# Patient Record
Sex: FEMALE | Race: BLACK OR AFRICAN AMERICAN | NOT HISPANIC OR LATINO | URBAN - METROPOLITAN AREA
[De-identification: names, ages, dates, MRNs, and addresses within clinical notes are randomized per-mention and may not be internally consistent; named-entity substitution may affect disease eponyms.]

---

## 2018-06-14 ENCOUNTER — OFFICE VISIT (OUTPATIENT)
Dept: URBAN - METROPOLITAN AREA CLINIC 18 | Facility: CLINIC | Age: 38
End: 2018-06-14
Payer: COMMERCIAL

## 2018-06-14 DIAGNOSIS — H40.023 OPEN ANGLE WITH BORDERLINE FINDINGS, HIGH RISK, BILATERAL: Primary | ICD-10-CM

## 2018-06-14 PROCEDURE — 99213 OFFICE O/P EST LOW 20 MIN: CPT | Performed by: OPTOMETRIST

## 2018-06-14 RX ORDER — TIMOLOL MALEATE 5 MG/ML
0.5 % SOLUTION/ DROPS OPHTHALMIC
Qty: 1 | Refills: 3 | Status: INACTIVE
Start: 2018-06-14 | End: 2018-09-06

## 2018-06-14 ASSESSMENT — INTRAOCULAR PRESSURE
OD: 21
OS: 21

## 2018-06-14 ASSESSMENT — KERATOMETRY
OS: 41.88
OD: 42.00

## 2018-06-14 NOTE — IMPRESSION/PLAN
Impression: Open angle with borderline findings, high risk, bilateral: H40.023. Family hx (mother), Large C/D Goal IOP 15's OU Plan: Borderline glaucoma, intraocular pressure stable without medication. Continue without medication and observe. D/CBrimonidine BID OU, Start Timolol BID OU (Er'xed to pharmacy). Will continue to monitor IOP.

## 2018-08-02 ENCOUNTER — OFFICE VISIT (OUTPATIENT)
Dept: URBAN - METROPOLITAN AREA CLINIC 18 | Facility: CLINIC | Age: 38
End: 2018-08-02
Payer: COMMERCIAL

## 2018-08-02 PROCEDURE — 99213 OFFICE O/P EST LOW 20 MIN: CPT | Performed by: OPTOMETRIST

## 2018-08-02 RX ORDER — DORZOLAMIDE HYDROCHLORIDE AND TIMOLOL MALEATE 20; 5 MG/ML; MG/ML
SOLUTION/ DROPS OPHTHALMIC
Qty: 5 | Refills: 6 | Status: ACTIVE
Start: 2018-08-02

## 2018-08-02 ASSESSMENT — INTRAOCULAR PRESSURE
OD: 19
OS: 18

## 2018-08-02 ASSESSMENT — KERATOMETRY
OD: 42.00
OS: 42.13

## 2018-08-02 NOTE — IMPRESSION/PLAN
Impression: Open angle with borderline findings, high risk, bilateral: H40.023. IOP Borderline, increased cupping OU, thin RNFL, normal CCT's no need to adjust IOP's Plan: Discussed diagnosis in detail with patient. Discussed treatment options with patient. D/C Timolol and Start Cosopt BID OU. Stressed compliance. Will reassess IOP OU in 1 month.

## 2018-09-06 ENCOUNTER — OFFICE VISIT (OUTPATIENT)
Dept: URBAN - METROPOLITAN AREA CLINIC 18 | Facility: CLINIC | Age: 38
End: 2018-09-06
Payer: COMMERCIAL

## 2018-09-06 PROCEDURE — 99213 OFFICE O/P EST LOW 20 MIN: CPT | Performed by: OPTOMETRIST

## 2018-09-06 ASSESSMENT — INTRAOCULAR PRESSURE
OS: 16
OD: 15

## 2018-09-06 ASSESSMENT — KERATOMETRY
OD: 42.13
OS: 42.00

## 2018-09-06 NOTE — IMPRESSION/PLAN
Impression: Open angle with borderline findings, high risk, bilateral: H40.023. IOP's improved w/treatment 15/16 Plan: Discussed diagnosis in detail with patient. Current therapy is best for patient. Cosopt BID OU Will continue to monitor IOP. Goal IOP mid-teens.